# Patient Record
Sex: FEMALE | Race: WHITE | Employment: PART TIME | URBAN - METROPOLITAN AREA
[De-identification: names, ages, dates, MRNs, and addresses within clinical notes are randomized per-mention and may not be internally consistent; named-entity substitution may affect disease eponyms.]

---

## 2019-08-21 ENCOUNTER — OFFICE VISIT (OUTPATIENT)
Dept: OBGYN CLINIC | Age: 34
End: 2019-08-21
Payer: MEDICAID

## 2019-08-21 VITALS
SYSTOLIC BLOOD PRESSURE: 168 MMHG | BODY MASS INDEX: 40.85 KG/M2 | DIASTOLIC BLOOD PRESSURE: 100 MMHG | WEIGHT: 222 LBS | HEART RATE: 111 BPM | HEIGHT: 62 IN

## 2019-08-21 DIAGNOSIS — Z32.02 NEGATIVE PREGNANCY TEST: ICD-10-CM

## 2019-08-21 DIAGNOSIS — N91.2 AMENORRHEA: Primary | ICD-10-CM

## 2019-08-21 LAB
CONTROL: NORMAL
PREGNANCY TEST URINE, POC: NORMAL

## 2019-08-21 PROCEDURE — G8417 CALC BMI ABV UP PARAM F/U: HCPCS | Performed by: SPECIALIST

## 2019-08-21 PROCEDURE — G8427 DOCREV CUR MEDS BY ELIG CLIN: HCPCS | Performed by: SPECIALIST

## 2019-08-21 PROCEDURE — 4004F PT TOBACCO SCREEN RCVD TLK: CPT | Performed by: SPECIALIST

## 2019-08-21 PROCEDURE — 81025 URINE PREGNANCY TEST: CPT | Performed by: SPECIALIST

## 2019-08-21 PROCEDURE — 99213 OFFICE O/P EST LOW 20 MIN: CPT | Performed by: SPECIALIST

## 2019-08-21 ASSESSMENT — ENCOUNTER SYMPTOMS
NAUSEA: 0
ABDOMINAL PAIN: 0
EYE PAIN: 0
APNEA: 0
ABDOMINAL DISTENTION: 0
VOMITING: 0
CONSTIPATION: 0
COUGH: 0
DIARRHEA: 0

## 2020-09-04 ENCOUNTER — APPOINTMENT (OUTPATIENT)
Dept: CT IMAGING | Age: 35
End: 2020-09-04
Payer: MEDICAID

## 2020-09-04 ENCOUNTER — HOSPITAL ENCOUNTER (EMERGENCY)
Age: 35
Discharge: HOME | End: 2020-09-04
Attending: EMERGENCY MEDICINE
Payer: MEDICAID

## 2020-09-04 VITALS
TEMPERATURE: 98.5 F | HEART RATE: 91 BPM | DIASTOLIC BLOOD PRESSURE: 86 MMHG | OXYGEN SATURATION: 99 % | BODY MASS INDEX: 40.24 KG/M2 | SYSTOLIC BLOOD PRESSURE: 141 MMHG | RESPIRATION RATE: 13 BRPM | WEIGHT: 220 LBS

## 2020-09-04 LAB
ABSOLUTE EOS #: 0.12 K/UL (ref 0–0.44)
ABSOLUTE IMMATURE GRANULOCYTE: 0.08 K/UL (ref 0–0.3)
ABSOLUTE LYMPH #: 1.75 K/UL (ref 1.1–3.7)
ABSOLUTE MONO #: 0.43 K/UL (ref 0.1–1.2)
ANION GAP SERPL CALCULATED.3IONS-SCNC: 13 MMOL/L (ref 9–17)
BASOPHILS # BLD: 1 % (ref 0–2)
BASOPHILS ABSOLUTE: 0.06 K/UL (ref 0–0.2)
BUN BLDV-MCNC: 8 MG/DL (ref 6–20)
BUN/CREAT BLD: NORMAL (ref 9–20)
CALCIUM SERPL-MCNC: 9.4 MG/DL (ref 8.6–10.4)
CHLORIDE BLD-SCNC: 105 MMOL/L (ref 98–107)
CO2: 22 MMOL/L (ref 20–31)
CREAT SERPL-MCNC: 0.68 MG/DL (ref 0.5–0.9)
DIFFERENTIAL TYPE: ABNORMAL
EOSINOPHILS RELATIVE PERCENT: 1 % (ref 1–4)
GFR AFRICAN AMERICAN: >60 ML/MIN
GFR NON-AFRICAN AMERICAN: >60 ML/MIN
GFR SERPL CREATININE-BSD FRML MDRD: NORMAL ML/MIN/{1.73_M2}
GFR SERPL CREATININE-BSD FRML MDRD: NORMAL ML/MIN/{1.73_M2}
GLUCOSE BLD-MCNC: 99 MG/DL (ref 70–99)
HCG QUALITATIVE: NEGATIVE
HCT VFR BLD CALC: 42.6 % (ref 36.3–47.1)
HEMOGLOBIN: 14.2 G/DL (ref 11.9–15.1)
IMMATURE GRANULOCYTES: 1 %
LYMPHOCYTES # BLD: 18 % (ref 24–43)
MCH RBC QN AUTO: 31.3 PG (ref 25.2–33.5)
MCHC RBC AUTO-ENTMCNC: 33.3 G/DL (ref 28.4–34.8)
MCV RBC AUTO: 93.8 FL (ref 82.6–102.9)
MONOCYTES # BLD: 4 % (ref 3–12)
NRBC AUTOMATED: 0 PER 100 WBC
PDW BLD-RTO: 12.7 % (ref 11.8–14.4)
PLATELET # BLD: 205 K/UL (ref 138–453)
PLATELET ESTIMATE: ABNORMAL
PMV BLD AUTO: 10.7 FL (ref 8.1–13.5)
POTASSIUM SERPL-SCNC: 4 MMOL/L (ref 3.7–5.3)
RBC # BLD: 4.54 M/UL (ref 3.95–5.11)
RBC # BLD: ABNORMAL 10*6/UL
SEG NEUTROPHILS: 75 % (ref 36–65)
SEGMENTED NEUTROPHILS ABSOLUTE COUNT: 7.47 K/UL (ref 1.5–8.1)
SODIUM BLD-SCNC: 140 MMOL/L (ref 135–144)
WBC # BLD: 9.9 K/UL (ref 3.5–11.3)
WBC # BLD: ABNORMAL 10*3/UL

## 2020-09-04 PROCEDURE — 6360000004 HC RX CONTRAST MEDICATION: Performed by: STUDENT IN AN ORGANIZED HEALTH CARE EDUCATION/TRAINING PROGRAM

## 2020-09-04 PROCEDURE — 85025 COMPLETE CBC W/AUTO DIFF WBC: CPT

## 2020-09-04 PROCEDURE — 84703 CHORIONIC GONADOTROPIN ASSAY: CPT

## 2020-09-04 PROCEDURE — 99284 EMERGENCY DEPT VISIT MOD MDM: CPT

## 2020-09-04 PROCEDURE — 96376 TX/PRO/DX INJ SAME DRUG ADON: CPT

## 2020-09-04 PROCEDURE — 96374 THER/PROPH/DIAG INJ IV PUSH: CPT

## 2020-09-04 PROCEDURE — 72131 CT LUMBAR SPINE W/O DYE: CPT

## 2020-09-04 PROCEDURE — 72125 CT NECK SPINE W/O DYE: CPT

## 2020-09-04 PROCEDURE — 71260 CT THORAX DX C+: CPT

## 2020-09-04 PROCEDURE — 70450 CT HEAD/BRAIN W/O DYE: CPT

## 2020-09-04 PROCEDURE — 80048 BASIC METABOLIC PNL TOTAL CA: CPT

## 2020-09-04 PROCEDURE — 72128 CT CHEST SPINE W/O DYE: CPT

## 2020-09-04 PROCEDURE — 6360000002 HC RX W HCPCS: Performed by: STUDENT IN AN ORGANIZED HEALTH CARE EDUCATION/TRAINING PROGRAM

## 2020-09-04 RX ORDER — ACETAMINOPHEN 500 MG
500 TABLET ORAL 4 TIMES DAILY PRN
Qty: 30 TABLET | Refills: 0 | Status: SHIPPED | OUTPATIENT
Start: 2020-09-04

## 2020-09-04 RX ORDER — FENTANYL CITRATE 50 UG/ML
50 INJECTION, SOLUTION INTRAMUSCULAR; INTRAVENOUS ONCE
Status: COMPLETED | OUTPATIENT
Start: 2020-09-04 | End: 2020-09-04

## 2020-09-04 RX ORDER — IBUPROFEN 600 MG/1
600 TABLET ORAL EVERY 6 HOURS PRN
Qty: 30 TABLET | Refills: 0 | Status: SHIPPED | OUTPATIENT
Start: 2020-09-04

## 2020-09-04 RX ORDER — CYCLOBENZAPRINE HCL 5 MG
5 TABLET ORAL 2 TIMES DAILY PRN
Qty: 10 TABLET | Refills: 0 | Status: SHIPPED | OUTPATIENT
Start: 2020-09-04 | End: 2020-09-14

## 2020-09-04 RX ADMIN — FENTANYL CITRATE 50 MCG: 50 INJECTION, SOLUTION INTRAMUSCULAR; INTRAVENOUS at 15:15

## 2020-09-04 RX ADMIN — IOHEXOL 75 ML: 350 INJECTION, SOLUTION INTRAVENOUS at 16:19

## 2020-09-04 RX ADMIN — FENTANYL CITRATE 50 MCG: 50 INJECTION, SOLUTION INTRAMUSCULAR; INTRAVENOUS at 16:47

## 2020-09-04 ASSESSMENT — PAIN DESCRIPTION - PAIN TYPE: TYPE: ACUTE PAIN

## 2020-09-04 ASSESSMENT — PAIN DESCRIPTION - PROGRESSION: CLINICAL_PROGRESSION: GRADUALLY WORSENING

## 2020-09-04 ASSESSMENT — PAIN SCALES - GENERAL
PAINLEVEL_OUTOF10: 10

## 2020-09-04 ASSESSMENT — PAIN DESCRIPTION - ONSET: ONSET: SUDDEN

## 2020-09-04 ASSESSMENT — PAIN DESCRIPTION - DESCRIPTORS: DESCRIPTORS: ACHING

## 2020-09-04 ASSESSMENT — PAIN DESCRIPTION - ORIENTATION: ORIENTATION: MID

## 2020-09-04 ASSESSMENT — PAIN DESCRIPTION - LOCATION: LOCATION: BACK;HEAD
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
## 2020-09-04 NOTE — ED NOTES
Bed: 21  Expected date:   Expected time:   Means of arrival:   Comments:  [id number] Yovanny Velazquez RN  09/04/20 1443

## 2020-09-04 NOTE — RESULT ENCOUNTER NOTE
Addressed in ED   Patient needs appointment as a new patient with any of the providers  No future appointments.

## 2020-09-08 ENCOUNTER — TELEPHONE (OUTPATIENT)
Dept: FAMILY MEDICINE CLINIC | Age: 35
End: 2020-09-08
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
## 2023-08-18 ENCOUNTER — HOSPITAL ENCOUNTER (EMERGENCY)
Age: 38
Discharge: HOME | End: 2023-08-18
Attending: EMERGENCY MEDICINE
Payer: MEDICAID

## 2023-08-18 ENCOUNTER — APPOINTMENT (OUTPATIENT)
Dept: ULTRASOUND IMAGING | Age: 38
End: 2023-08-18
Payer: MEDICAID

## 2023-08-18 ENCOUNTER — APPOINTMENT (OUTPATIENT)
Dept: CT IMAGING | Age: 38
End: 2023-08-18
Payer: MEDICAID

## 2023-08-18 VITALS
HEIGHT: 63 IN | DIASTOLIC BLOOD PRESSURE: 77 MMHG | RESPIRATION RATE: 20 BRPM | OXYGEN SATURATION: 98 % | HEART RATE: 64 BPM | WEIGHT: 206.57 LBS | BODY MASS INDEX: 36.6 KG/M2 | SYSTOLIC BLOOD PRESSURE: 142 MMHG | TEMPERATURE: 98.3 F

## 2023-08-18 DIAGNOSIS — R22.1 NECK MASS: Primary | ICD-10-CM

## 2023-08-18 LAB
BASOPHILS # BLD: 0.05 K/UL (ref 0–0.2)
BASOPHILS NFR BLD: 1 % (ref 0–2)
EOSINOPHIL # BLD: 0.11 K/UL (ref 0–0.44)
EOSINOPHILS RELATIVE PERCENT: 2 % (ref 1–4)
ERYTHROCYTE [DISTWIDTH] IN BLOOD BY AUTOMATED COUNT: 11.7 % (ref 11.8–14.4)
HCT VFR BLD AUTO: 37.2 % (ref 36.3–47.1)
HGB BLD-MCNC: 12.1 G/DL (ref 11.9–15.1)
IMM GRANULOCYTES # BLD AUTO: 0.03 K/UL (ref 0–0.3)
IMM GRANULOCYTES NFR BLD: 0 %
LYMPHOCYTES NFR BLD: 1.28 K/UL (ref 1.1–3.7)
LYMPHOCYTES RELATIVE PERCENT: 18 % (ref 24–43)
MCH RBC QN AUTO: 30.6 PG (ref 25.2–33.5)
MCHC RBC AUTO-ENTMCNC: 32.5 G/DL (ref 28.4–34.8)
MCV RBC AUTO: 93.9 FL (ref 82.6–102.9)
MONOCYTES NFR BLD: 0.38 K/UL (ref 0.1–1.2)
MONOCYTES NFR BLD: 5 % (ref 3–12)
NEUTROPHILS NFR BLD: 74 % (ref 36–65)
NEUTS SEG NFR BLD: 5.28 K/UL (ref 1.5–8.1)
NRBC BLD-RTO: 0 PER 100 WBC
PLATELET # BLD AUTO: 208 K/UL (ref 138–453)
PMV BLD AUTO: 10.3 FL (ref 8.1–13.5)
RBC # BLD AUTO: 3.96 M/UL (ref 3.95–5.11)
WBC OTHER # BLD: 7.1 K/UL (ref 3.5–11.3)

## 2023-08-18 PROCEDURE — 99285 EMERGENCY DEPT VISIT HI MDM: CPT

## 2023-08-18 PROCEDURE — 6360000004 HC RX CONTRAST MEDICATION

## 2023-08-18 PROCEDURE — 85025 COMPLETE CBC W/AUTO DIFF WBC: CPT

## 2023-08-18 PROCEDURE — 70491 CT SOFT TISSUE NECK W/DYE: CPT

## 2023-08-18 PROCEDURE — 6370000000 HC RX 637 (ALT 250 FOR IP)

## 2023-08-18 PROCEDURE — 76536 US EXAM OF HEAD AND NECK: CPT

## 2023-08-18 RX ORDER — AMOXICILLIN AND CLAVULANATE POTASSIUM 875; 125 MG/1; MG/1
1 TABLET, FILM COATED ORAL 2 TIMES DAILY
Qty: 14 TABLET | Refills: 0 | Status: SHIPPED | OUTPATIENT
Start: 2023-08-18 | End: 2023-08-25

## 2023-08-18 RX ORDER — DOXYCYCLINE HYCLATE 100 MG
100 TABLET ORAL 2 TIMES DAILY
Status: DISCONTINUED | OUTPATIENT
Start: 2023-08-18 | End: 2023-08-19 | Stop reason: HOSPADM

## 2023-08-18 RX ADMIN — IOPAMIDOL 75 ML: 755 INJECTION, SOLUTION INTRAVENOUS at 20:31

## 2023-08-18 RX ADMIN — DOXYCYCLINE HYCLATE 100 MG: 100 TABLET, COATED ORAL at 19:50

## 2023-08-18 ASSESSMENT — PAIN DESCRIPTION - ORIENTATION: ORIENTATION: LEFT;UPPER

## 2023-08-18 ASSESSMENT — PAIN DESCRIPTION - PAIN TYPE: TYPE: ACUTE PAIN

## 2023-08-18 ASSESSMENT — PAIN SCALES - GENERAL: PAINLEVEL_OUTOF10: 5

## 2023-08-18 ASSESSMENT — PAIN - FUNCTIONAL ASSESSMENT: PAIN_FUNCTIONAL_ASSESSMENT: 0-10

## 2023-08-18 ASSESSMENT — PAIN DESCRIPTION - DESCRIPTORS: DESCRIPTORS: STABBING;SHOOTING

## 2023-08-19 NOTE — DISCHARGE INSTRUCTIONS
Please follow-up with Ear, Nose, and Throat (ENT) specialist for further care and evaluation of your neck mass. You have been prescribed an antibiotic for possible infection. Please take the entire course of antibiotics and do not miss any doses.

## 2023-08-28 ENCOUNTER — TELEPHONE (OUTPATIENT)
Age: 38
End: 2023-08-28

## 2023-08-28 NOTE — TELEPHONE ENCOUNTER
Patient is scheduled with Dr. Amy Taveras on 08/31/23. Dr. Amy Taveras reviewed patient's chart and recommends patient to see ENT- Dr. Richardson Chau for her neck mass. Case discussed with Dr. Richardson Chau and his office will call patient to schedule. Please cancel general surgery appt.

## 2023-10-07 ENCOUNTER — HOSPITAL ENCOUNTER (OUTPATIENT)
Dept: MRI IMAGING | Age: 38
End: 2023-10-07
Payer: MEDICAID

## 2023-10-07 DIAGNOSIS — R22.1 MASS OF PARAPHARYNGEAL SPACE: ICD-10-CM

## 2023-10-07 LAB
CREAT SERPL-MCNC: 0.9 MG/DL (ref 0.5–0.9)
GFR SERPL CREATININE-BSD FRML MDRD: >60 ML/MIN/1.73M2

## 2023-10-07 PROCEDURE — A9579 GAD-BASE MR CONTRAST NOS,1ML: HCPCS | Performed by: STUDENT IN AN ORGANIZED HEALTH CARE EDUCATION/TRAINING PROGRAM

## 2023-10-07 PROCEDURE — 82565 ASSAY OF CREATININE: CPT

## 2023-10-07 PROCEDURE — 36415 COLL VENOUS BLD VENIPUNCTURE: CPT

## 2023-10-07 PROCEDURE — 6360000004 HC RX CONTRAST MEDICATION: Performed by: STUDENT IN AN ORGANIZED HEALTH CARE EDUCATION/TRAINING PROGRAM

## 2023-10-07 PROCEDURE — 70543 MRI ORBT/FAC/NCK W/O &W/DYE: CPT

## 2023-10-07 RX ADMIN — GADOTERIDOL 19 ML: 279.3 INJECTION, SOLUTION INTRAVENOUS at 11:48

## 2023-10-16 ENCOUNTER — TELEPHONE (OUTPATIENT)
Dept: OTOLARYNGOLOGY | Age: 38
End: 2023-10-16

## 2023-10-16 DIAGNOSIS — R22.1 MASS OF PARAPHARYNGEAL SPACE: Primary | ICD-10-CM

## 2023-10-16 NOTE — TELEPHONE ENCOUNTER
Called and discussed MRI results with patient and discussed need for biopsy from IR ASAP. Patient verbalized understanding and is in agreement with this plan.

## 2023-10-27 ENCOUNTER — HOSPITAL ENCOUNTER (OUTPATIENT)
Dept: ULTRASOUND IMAGING | Age: 38
End: 2023-10-27
Payer: MEDICAID

## 2023-10-27 VITALS — SYSTOLIC BLOOD PRESSURE: 122 MMHG | OXYGEN SATURATION: 100 % | DIASTOLIC BLOOD PRESSURE: 84 MMHG

## 2023-10-27 DIAGNOSIS — R22.1 MASS OF PARAPHARYNGEAL SPACE: ICD-10-CM

## 2023-10-27 PROCEDURE — 87176 TISSUE HOMOGENIZATION CULTR: CPT

## 2023-10-27 PROCEDURE — 88333 PATH CONSLTJ SURG CYTO XM 1: CPT

## 2023-10-27 PROCEDURE — 87075 CULTR BACTERIA EXCEPT BLOOD: CPT

## 2023-10-27 PROCEDURE — 87070 CULTURE OTHR SPECIMN AEROBIC: CPT

## 2023-10-27 PROCEDURE — 88172 CYTP DX EVAL FNA 1ST EA SITE: CPT

## 2023-10-27 PROCEDURE — 88342 IMHCHEM/IMCYTCHM 1ST ANTB: CPT

## 2023-10-27 PROCEDURE — 38505 NEEDLE BIOPSY LYMPH NODES: CPT

## 2023-10-27 PROCEDURE — 88305 TISSUE EXAM BY PATHOLOGIST: CPT

## 2023-10-27 PROCEDURE — 76942 ECHO GUIDE FOR BIOPSY: CPT

## 2023-10-27 PROCEDURE — 87205 SMEAR GRAM STAIN: CPT

## 2023-10-27 PROCEDURE — 88173 CYTOPATH EVAL FNA REPORT: CPT

## 2023-10-27 NOTE — PROGRESS NOTES
Here for a cervical LN biopsy. Consent done. Dr Araujo/ Joanne Mendenhall present. Left neck,prepped, draped and numbed with lidocaine. Cytology here. 4 FNA's and 1 core specimens obtained. Post scan done. Bandaid on and patient discharged with ice pack Tolerated well.

## 2023-10-27 NOTE — BRIEF OP NOTE
Brief Postoperative Note    María Elena Felix  YOB: 1985  [id number]    Pre-operative Diagnosis: Left submandibular lymphadenopathy    Post-operative Diagnosis: Same    Procedure: LN bx    Anesthesia: Local    Surgeons/Assistants: Maria Fernanda Roberson MD    Estimated Blood Loss: less than 50     Complications: None    Specimens: Was Obtained:     Findings: Successful left submandibular LN bx.  4 FNA samples and 1 core sample obtained.      Electronically signed by YUDY Ricks on 10/27/2023 at 9:13 AM

## 2023-10-29 LAB
MICROORGANISM SPEC CULT: NORMAL
MICROORGANISM/AGENT SPEC: NORMAL
MICROORGANISM/AGENT SPEC: NORMAL
SPECIMEN DESCRIPTION: NORMAL

## 2023-10-30 LAB — SURGICAL PATHOLOGY REPORT: NORMAL

## 2023-10-31 ENCOUNTER — TELEPHONE (OUTPATIENT)
Dept: OTOLARYNGOLOGY | Age: 38
End: 2023-10-31

## 2023-10-31 DIAGNOSIS — R22.1 MASS OF PARAPHARYNGEAL SPACE: Primary | ICD-10-CM

## 2023-10-31 NOTE — TELEPHONE ENCOUNTER
BRIEF ENT TELEPHONE NOTE    Attempted to reach patient to discuss FNA biopsy results:    -------------------------------------------------------------------------------------------    Path Number: [id number]     INTERPRETATION     LEFT SUBMANDIBULAR LYMPH NODE, FINE NEEDLE ASPIRATION:           SUSPICIOUS FOR CARCINOMA. SEE COMMENT. **Electronically Signed Out**         Alannah Augustin. Srinivas Quinonez M.D.   kmg2/10/30/2023   Comment   Smears show cohesive groups of atypical cells with increased nuclear :   cytoplasmic ratio and nuclear irregularities. However, the cell block   does not contain atypical cells, which precludes further diagnostic   workup. The findings are suspicious for carcinoma. If further   classification is required, additional tissue biopsy is recommended. Slides are reviewed by a second pathologist who concurs with the   diagnosis (DS).  -------------------------------------------------------------------------------------------     I will attempt to reach patient at later time. We have referred her to [facility] and Ne[facility] who will be contacting patient to set up appointment.     Juanita Douglass MD  Otolaryngology - Head and [address] @ [facility]    Can be reach via [facility]

## 2023-10-31 NOTE — TELEPHONE ENCOUNTER
BRIEF ENT TELEPHONE NOTE    -Discussed results of biopsy with patient. We will get her care transitioned to [facility] and Neck Surgery. She will call our clinic to let us know if she hasn't heard from their office by the end of the week.      Fredo Chan MD  Otolaryngology - Head and [address] @ [facility]

## 2023-11-01 LAB
MICROORGANISM SPEC CULT: NORMAL
MICROORGANISM/AGENT SPEC: NORMAL
MICROORGANISM/AGENT SPEC: NORMAL
SPECIMEN DESCRIPTION: NORMAL
SURGICAL PATHOLOGY REPORT: NORMAL

## 2023-11-14 ENCOUNTER — TELEPHONE (OUTPATIENT)
Dept: OTOLARYNGOLOGY | Age: 38
End: 2023-11-14

## 2023-11-14 DIAGNOSIS — R22.1 MASS OF PARAPHARYNGEAL SPACE: Primary | ICD-10-CM

## 2023-11-14 NOTE — TELEPHONE ENCOUNTER
BRIEF ENT TELEPHONE NOTE    Telephone check in. Patient was seen by [facility] Neck Surgery yesterday. Per patient, she is scheduled for surgery next Wednesday. She will call our office with any additional questions or concerns.     Fredo Chan MD  Otolaryngology - Head and [address] @ [facility]

## 2024-02-28 ENCOUNTER — HOSPITAL ENCOUNTER (EMERGENCY)
Age: 39
Discharge: HOME | End: 2024-02-28
Attending: EMERGENCY MEDICINE
Payer: MEDICAID

## 2024-02-28 ENCOUNTER — APPOINTMENT (OUTPATIENT)
Dept: GENERAL RADIOLOGY | Age: 39
End: 2024-02-28
Payer: MEDICAID

## 2024-02-28 ENCOUNTER — APPOINTMENT (OUTPATIENT)
Dept: VASCULAR LAB | Age: 39
End: 2024-02-28
Payer: MEDICAID

## 2024-02-28 VITALS
OXYGEN SATURATION: 100 % | DIASTOLIC BLOOD PRESSURE: 91 MMHG | SYSTOLIC BLOOD PRESSURE: 127 MMHG | BODY MASS INDEX: 33.53 KG/M2 | TEMPERATURE: 98.6 F | HEART RATE: 89 BPM | RESPIRATION RATE: 16 BRPM | WEIGHT: 186.29 LBS

## 2024-02-28 DIAGNOSIS — M79.601 PAIN AND SWELLING OF RIGHT UPPER EXTREMITY: Primary | ICD-10-CM

## 2024-02-28 DIAGNOSIS — M79.89 PAIN AND SWELLING OF RIGHT UPPER EXTREMITY: Primary | ICD-10-CM

## 2024-02-28 PROCEDURE — 71045 X-RAY EXAM CHEST 1 VIEW: CPT

## 2024-02-28 PROCEDURE — 99284 EMERGENCY DEPT VISIT MOD MDM: CPT

## 2024-02-28 PROCEDURE — 93971 EXTREMITY STUDY: CPT | Performed by: SURGERY

## 2024-02-28 PROCEDURE — 6370000000 HC RX 637 (ALT 250 FOR IP): Performed by: STUDENT IN AN ORGANIZED HEALTH CARE EDUCATION/TRAINING PROGRAM

## 2024-02-28 PROCEDURE — 93971 EXTREMITY STUDY: CPT

## 2024-02-28 RX ORDER — ONDANSETRON 4 MG/1
4 TABLET, ORALLY DISINTEGRATING ORAL ONCE
Status: COMPLETED | OUTPATIENT
Start: 2024-02-28 | End: 2024-02-28

## 2024-02-28 RX ADMIN — ONDANSETRON 4 MG: 4 TABLET, ORALLY DISINTEGRATING ORAL at 09:37

## 2024-02-28 ASSESSMENT — ENCOUNTER SYMPTOMS
SORE THROAT: 0
WHEEZING: 0
ABDOMINAL DISTENTION: 0
COLOR CHANGE: 0
SHORTNESS OF BREATH: 0
VOMITING: 0
NAUSEA: 0
SINUS PRESSURE: 0
SINUS PAIN: 0
PHOTOPHOBIA: 0
COUGH: 0
BACK PAIN: 0
DIARRHEA: 0
ABDOMINAL PAIN: 0

## 2024-02-28 ASSESSMENT — PAIN SCALES - GENERAL: PAINLEVEL_OUTOF10: 6

## 2024-02-28 ASSESSMENT — PAIN DESCRIPTION - ORIENTATION: ORIENTATION: RIGHT

## 2024-02-28 ASSESSMENT — PAIN DESCRIPTION - LOCATION: LOCATION: ARM

## 2024-02-28 NOTE — ED NOTES
Patient presents to ED ambulatory to room. Patient has complaints of right arm pain and swelling after a hydration infusion yesterday. Patient has a port on her right side of chest. Patient noticed the pain after the infusion and the swelling of arm and hand this morning. Patient has head and neck cancer and is currently receiving treatment. Patient last chemo treatment was on Friday. Patient is rating the pain at a 6/10 and denies taking anything for the pain.

## 2024-02-28 NOTE — DISCHARGE INSTRUCTIONS
Seen in the emergency department for right arm swelling and pain.  Ultrasound performed.  No deep vein thrombosis or blood clot.  Appears you do have a kinked port which was seen on chest x-ray.  Discussed this case with vascular surgery who is recommending that you follow-up in their outpatient office tomorrow.  Please call their office first thing tomorrow to schedule an appointment.  Please return to the emergency department if you develop worsening pain, swelling, fevers.  Please continue to wear Ace wrap for compression which helps with the swelling.  Continue to follow with your primary care as needed
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
## 2024-02-28 NOTE — ED PROVIDER NOTES
compartment syndrome.  No acute skin changes, rash, erythema, open lesions, deformities, trauma, active bleeding, or any signs of infection.  Of note, she does have old bruising at her breast tissue and immediately surrounding her port site.  Per patient she had a very traumatic port placement and has been dealing with bruising since then.  Received IV hydration yesterday and since then has been dealing with a swollen right upper extremity with pain.    She arrives in no apparent distress with a heart rate of 89 and blood pressure of 127/91.  She satting 100% on room air without increased work of breathing breathing 16 times per minute.  Afebrile.  She denies chest pain, shortness of breath, abdominal pain.  No changes from her baseline nausea and vomiting with chemotherapy.  Concerns for DVT or potential port complication.  Plan for venous duplex to rule out DVT along with chest x-ray in order to evaluate port placement.     Amount and/or Complexity of Data Reviewed  Radiology: ordered. Decision-making details documented in ED Course.    Risk  Prescription drug management.      EKG  N/a    All EKG's are interpreted by the Emergency Department Physician who either signs or Co-signs this chart in the absence of a cardiologist.    EMERGENCY DEPARTMENT COURSE:    ED Course as of 02/28/24 1507   Wed Feb 28, 2024   1229 Vascular duplex upper extremity venous right  No evidence of deep vein or superficial vein thrombosis. The internal jugular, subclavian, axillary, brachial, radial, ulnar, basilic, and cephalic veins were imaged in the transverse view and showed normal compressibility. The internal jugular, subclavian, and axillary veins were imaged in the longitudinal view and showed normal color filling and normal phasic and spontaneous flow. [GC]   1230 XR CHEST PORTABLE  IMPRESSION:  1. Right-sided port with the tip over the proximal SVC and mild kinking of  the proximal aspect of the catheter.  2. Bibasilar  atelectasis.  The lungs are otherwise clear. [GC]   1238 Vascular surgery consulted [GC]   1504 Discussed the case with vascular surgery attending, Dr. Couch, who recommended that the patient follow-up in their outpatient office tomorrow.  Recommended Ace wrap compression. [GC]   1505 Patient is medically stable to be discharged home.  Has appropriate follow-up.  Given appropriate return instructions.  Remains neurovascular intact at time of discharge.  Continue to have no concerns for compartment syndrome. [GC]   1507 Educated on compartment syndrome and attached information regarding compartment syndrome [GC]      ED Course User Index  [GC] Jeremy Bolton DO       PROCEDURES:  N/a    CONSULTS:  IP CONSULT TO VASCULAR SURGERY    CRITICAL CARE:  There was significant risk of life threatening deterioration of patient's condition requiring my direct management. Critical care time 00 minutes, excluding any documented procedures.    FINAL IMPRESSION      1. Pain and swelling of right upper extremity          DISPOSITION / PLAN     DISPOSITION Decision To Discharge 02/28/2024 03:01:11 PM      PATIENT REFERRED TO:  Mindi Olivas, APRN  [address] [facility] [address]  [phone]    Call in 1 day  for ED follow-up    Monse Graves MD  [address] [address] [id number]  [facility] [id number] [phone]    Call in 1 day  for right arm swelling      DISCHARGE MEDICATIONS:  New Prescriptions    No medications on file       Jeremy Bolton DO  Emergency Medicine Resident    (Please note that portions of this note were completed with a voice recognition program.  Efforts were made to edit the dictations but occasionally words are mis-transcribed.)

## 2024-02-28 NOTE — ED PROVIDER NOTES
[facility]     Emergency Department     Faculty Attestation  9:23 AM EST      I performed a history and physical examination of the patient and discussed management with the resident. I have reviewed and agree with the resident’s findings including all diagnostic interpretations, and treatment plans as written. Any areas of disagreement are noted on the chart. I was personally present for the key portions of any procedures. I have documented in the chart those procedures where I was not present during the key portions. I have reviewed the emergency nurses triage note. I agree with the chief complaint, past medical history, past surgical history, allergies, medications, social and family history as documented unless otherwise noted below. Documentation of the HPI, Physical Exam and Medical Decision Making performed by scribcara is based on my personal performance of the HPI, PE and MDM. For Physician Assistant/ Nurse Practitioner cases/documentation I have personally evaluated this patient and have completed at least one if not all key elements of the E/M (history, physical exam, and MDM). Additional findings are as noted.    Primary Care Physician: Mindi Olivas APRN    History: This is a 38 y.o. female who presents to the Emergency WITH pain and swelling to her right arm.  Patient has a port to her right chest.  Which was placed earlier this month as she has a history of squamous cell carcinoma and is undergoing radiation as well as chemotherapy.  Patient had hydration yesterday and started having discomfort after that.  And came in for further evaluation.  On exam    Patient does have some mottling and some swelling noted to the entire right upper extremity.  She does have a 2+ radial pulse that is equal.  She does have tenderness diffusely of that arm.  And it does seem more swollen than her left arm when she does not have any swelling or discomfort.   She does have some ecchymosis that appears old to her right breast.  And the incision over her port is approximated without any surrounding erythema or edema.    Concern for possible thrombus.  Will plan on vascular studies.  Check labs.  Xray to confirm port placement    Janelle Sesay D.O, M.P.H  Attending Emergency Medicine Physician         Janelle Sesay,   02/28/24 [id number]

## 2024-02-29 ENCOUNTER — INITIAL CONSULT (OUTPATIENT)
Dept: VASCULAR SURGERY | Age: 39
End: 2024-02-29

## 2024-02-29 VITALS
HEIGHT: 63 IN | RESPIRATION RATE: 18 BRPM | WEIGHT: 183 LBS | DIASTOLIC BLOOD PRESSURE: 90 MMHG | HEART RATE: 98 BPM | BODY MASS INDEX: 32.43 KG/M2 | SYSTOLIC BLOOD PRESSURE: 141 MMHG | OXYGEN SATURATION: 100 %

## 2024-02-29 DIAGNOSIS — Z98.890 HISTORY OF INSERTION OF TUNNELED CENTRAL VENOUS CATHETER (CVC) WITH PORT: ICD-10-CM

## 2024-02-29 DIAGNOSIS — M79.601 PAIN AND SWELLING OF UPPER EXTREMITY, RIGHT: Primary | ICD-10-CM

## 2024-02-29 DIAGNOSIS — M79.89 PAIN AND SWELLING OF UPPER EXTREMITY, RIGHT: Primary | ICD-10-CM

## 2024-02-29 NOTE — PROGRESS NOTES
Radial pulses are 2+ on the right side and 2+ on the left side.   Pulmonary:      Effort: Pulmonary effort is normal. No respiratory distress.   Chest:      Comments: Port palpated over right chest  Abdominal:      Palpations: Abdomen is soft.      Tenderness: There is no abdominal tenderness.   Musculoskeletal:      Right upper arm: Swelling present. No tenderness.      Left upper arm: No swelling or tenderness.      Right forearm: Swelling present. No tenderness.      Left forearm: No swelling or tenderness.      Right hand: Swelling present. Normal strength. Normal sensation. Normal capillary refill.      Left hand: No swelling. Normal strength. Normal sensation. Normal capillary refill.      Cervical back: Full passive range of motion without pain.      Right lower leg: No edema.      Left lower leg: No edema.   Skin:     General: Skin is warm.      Capillary Refill: Capillary refill takes less than 2 seconds.      Comments: Venous congestion in right upper extremity with bluish hue   Neurological:      Mental Status: She is alert and oriented to person, place, and time.      GCS: GCS eye subscore is 4. GCS verbal subscore is 5. GCS motor subscore is 6.      Sensory: Sensation is intact.      Motor: Motor function is intact.   Psychiatric:         Mood and Affect: Mood normal.         Speech: Speech normal.         Behavior: Behavior normal.         Thought Content: Thought content normal.                Imaging/Labs:         Assessment and Plan:     Right arm swelling, presence of central venous catheter with subcutaneous port  Suspect she has develop element of venous thoracic outlet syndrome with congestion  I explained to her that removing port should help with the swelling issues  For now will try symptomatic relief with compression and elevation, ace wrap applied in the office and showed her how to do it for effective compression until her need for port is completed   Will plan to remove port once

## 2024-03-07 ASSESSMENT — ENCOUNTER SYMPTOMS
ALLERGIC/IMMUNOLOGIC NEGATIVE: 1
CHEST TIGHTNESS: 0
COLOR CHANGE: 1
SHORTNESS OF BREATH: 0
ABDOMINAL PAIN: 0

## 2024-03-18 ENCOUNTER — TELEPHONE (OUTPATIENT)
Age: 39
End: 2024-03-18

## 2024-03-19 ENCOUNTER — HOSPITAL ENCOUNTER (OUTPATIENT)
Age: 39
Discharge: HOME | End: 2024-03-21
Attending: SURGERY
Payer: MEDICAID

## 2024-03-19 VITALS
RESPIRATION RATE: 16 BRPM | HEART RATE: 88 BPM | OXYGEN SATURATION: 100 % | BODY MASS INDEX: 33.67 KG/M2 | WEIGHT: 182.98 LBS | HEIGHT: 62 IN | DIASTOLIC BLOOD PRESSURE: 69 MMHG | SYSTOLIC BLOOD PRESSURE: 126 MMHG | TEMPERATURE: 96.3 F

## 2024-03-19 DIAGNOSIS — C44.92 SCC (SQUAMOUS CELL CARCINOMA): ICD-10-CM

## 2024-03-19 PROCEDURE — 2580000003 HC RX 258: Performed by: SURGERY

## 2024-03-19 PROCEDURE — 7100000011 HC PHASE II RECOVERY - ADDTL 15 MIN

## 2024-03-19 PROCEDURE — 2500000003 HC RX 250 WO HCPCS: Performed by: SURGERY

## 2024-03-19 PROCEDURE — 6360000002 HC RX W HCPCS: Performed by: SURGERY

## 2024-03-19 PROCEDURE — 36590 REMOVAL TUNNELED CV CATH: CPT

## 2024-03-19 PROCEDURE — 7100000010 HC PHASE II RECOVERY - FIRST 15 MIN

## 2024-03-19 RX ORDER — CEFAZOLIN SODIUM 1 G/3ML
INJECTION, POWDER, FOR SOLUTION INTRAMUSCULAR; INTRAVENOUS PRN
Status: COMPLETED | OUTPATIENT
Start: 2024-03-19 | End: 2024-03-19

## 2024-03-19 RX ORDER — FENTANYL CITRATE 50 UG/ML
INJECTION, SOLUTION INTRAMUSCULAR; INTRAVENOUS PRN
Status: COMPLETED | OUTPATIENT
Start: 2024-03-19 | End: 2024-03-19

## 2024-03-19 RX ORDER — MIDAZOLAM HYDROCHLORIDE 5 MG/ML
INJECTION INTRAMUSCULAR; INTRAVENOUS PRN
Status: COMPLETED | OUTPATIENT
Start: 2024-03-19 | End: 2024-03-19

## 2024-03-19 RX ORDER — SODIUM CHLORIDE 9 MG/ML
INJECTION, SOLUTION INTRAVENOUS CONTINUOUS
Status: DISCONTINUED | OUTPATIENT
Start: 2024-03-19 | End: 2024-03-22 | Stop reason: HOSPADM

## 2024-03-19 RX ORDER — LIDOCAINE HYDROCHLORIDE 10 MG/ML
INJECTION, SOLUTION INFILTRATION; PERINEURAL PRN
Status: COMPLETED | OUTPATIENT
Start: 2024-03-19 | End: 2024-03-19

## 2024-03-19 RX ADMIN — SODIUM CHLORIDE: 9 INJECTION, SOLUTION INTRAVENOUS at 11:34

## 2024-03-19 RX ADMIN — CEFAZOLIN 2000 MG: 1 INJECTION, POWDER, FOR SOLUTION INTRAMUSCULAR; INTRAVENOUS at 12:50

## 2024-03-19 RX ADMIN — FENTANYL CITRATE 50 MCG: 50 INJECTION, SOLUTION INTRAMUSCULAR; INTRAVENOUS at 12:50

## 2024-03-19 RX ADMIN — MIDAZOLAM HYDROCHLORIDE 2 MG: 5 INJECTION INTRAMUSCULAR; INTRAVENOUS at 12:51

## 2024-03-19 RX ADMIN — LIDOCAINE HYDROCHLORIDE 20 ML: 10 INJECTION, SOLUTION INFILTRATION; PERINEURAL at 12:54

## 2024-03-19 RX ADMIN — FENTANYL CITRATE 25 MCG: 50 INJECTION, SOLUTION INTRAMUSCULAR; INTRAVENOUS at 13:16

## 2024-03-19 ASSESSMENT — PAIN DESCRIPTION - PAIN TYPE
TYPE: ACUTE PAIN
TYPE: ACUTE PAIN

## 2024-03-19 ASSESSMENT — PAIN DESCRIPTION - LOCATION
LOCATION: CHEST
LOCATION: CHEST

## 2024-03-19 ASSESSMENT — PAIN DESCRIPTION - DESCRIPTORS
DESCRIPTORS: STABBING
DESCRIPTORS: STABBING

## 2024-03-19 ASSESSMENT — PAIN SCALES - GENERAL
PAINLEVEL_OUTOF10: 9
PAINLEVEL_OUTOF10: 9

## 2024-03-19 NOTE — PROGRESS NOTES
Patient received post port removal to pacu. Assessment obtained. Chest site with bandaid intact. No hematoma noted.

## 2024-03-19 NOTE — DISCHARGE INSTRUCTIONS
Remove band aid in 1-2 days    Ok to shower in 1-2 days, pat dry over steristrips    Remove steristrips in 5-6 days    Tylenol or motrin as needed for pain/discomfort    Call Dr. Graves if there are any problems or concerns [phone]     Dr. Graves's office will call to arrange follow up appointment in the next 6 weeks regarding arm swelling    Continue to use compression warps to help with swelling

## 2024-03-19 NOTE — PROGRESS NOTES
Patient admitted, consent signed and questions answered. Patient ready for procedure. Call light to reach with side rails u.  S.O. Darek at bedside with patient.  History and physical to be updated.

## 2024-03-19 NOTE — OP NOTE
Operative Note      Patient: María Elena Schaefer  YOB: 1985  MRN: [id number]    Date of Procedure: 3/19/2024    Preoperative Diagnosis:  Presence of central venous catheter with subcutaneous port     Post-Op Diagnosis: Same       Procedure:  Removal of central venous catheter with subcutaneous port under fluoroscopic guidance     Surgeon: Dr. Graves    Anesthesia: 2 mg versed, 75 mcg fentanyl, local    Estimated Blood Loss (mL): 11 ml    Complications: None    Specimens: port and catheter     Implants: none      Drains: None    Findings: Port and catheter removed completely intact, confirmed under fluoroscopy.      Detailed Description of Procedure:     María Elena Schaefer was brought to the [place] catheterization suite for removal of central venous catheter with subcutaneous port.  After appropriate anesthesia delivered, the right chest and neck was prepped and draped in standard sterile fashion, timeout performed and agreed upon, antibiotics given during this period.  I began by giving local anesthesia over the port site.  Small transverse incision created over the previous site.  Cautery used to get through the subcutaneous tissue and the port was identified.  It was externalized and the catheter portion removed easily, it was visually inspected and it was completely intact.  Manual pressure held by the neck to help with hemostasis.  Fluoroscopy performed and confirmed removal of entirety of the catheter.  Wound bed was hemostatic and was closed in multiple interrupted layers of vicryl followed by running monocryl.  Steristrip and band aid applied.  Patient tolerated procedure well and was brought to the recovery room.       Electronically signed by Monse Graves MD on 3/19/2024 at 1:21 PM

## 2024-03-19 NOTE — PROGRESS NOTES
All discharge instructions reviewed, questions answered, paper signed and given copy. Patient discharged per wheelchair with S.O.  and belongings.

## 2024-03-19 NOTE — H&P
Physical Exam:      Vitals:  BP (!) 141/90 (Site: Left Upper Arm, Position: Sitting, Cuff Size: Medium Adult)   Pulse 98   Resp 18   Ht 1.588 m (5' 2.5\")   Wt 83 kg (183 lb)   SpO2 100%   BMI 32.94 kg/m²      Physical Exam  Constitutional:       Appearance: She is well-developed and well-groomed. She is obese.   Eyes:      Extraocular Movements: Extraocular movements intact.      Conjunctiva/sclera: Conjunctivae normal.   Cardiovascular:      Rate and Rhythm: Normal rate and regular rhythm.      Pulses:           Radial pulses are 2+ on the right side and 2+ on the left side.   Pulmonary:      Effort: Pulmonary effort is normal. No respiratory distress.   Chest:      Comments: Port palpated over right chest  Abdominal:      Palpations: Abdomen is soft.      Tenderness: There is no abdominal tenderness.   Musculoskeletal:      Right upper arm: Swelling present. No tenderness.      Left upper arm: No swelling or tenderness.      Right forearm: Swelling present. No tenderness.      Left forearm: No swelling or tenderness.      Right hand: Swelling present. Normal strength. Normal sensation. Normal capillary refill.      Left hand: No swelling. Normal strength. Normal sensation. Normal capillary refill.      Cervical back: Full passive range of motion without pain.      Right lower leg: No edema.      Left lower leg: No edema.   Skin:     General: Skin is warm.      Capillary Refill: Capillary refill takes less than 2 seconds.      Comments: Venous congestion in right upper extremity with bluish hue   Neurological:      Mental Status: She is alert and oriented to person, place, and time.      GCS: GCS eye subscore is 4. GCS verbal subscore is 5. GCS motor subscore is 6.      Sensory: Sensation is intact.      Motor: Motor function is intact.   Psychiatric:         Mood and Affect: Mood normal.         Speech: Speech normal.         Behavior: Behavior normal.         Thought Content: Thought content normal.

## 2024-08-27 ENCOUNTER — TELEPHONE (OUTPATIENT)
Dept: ORTHOPEDIC SURGERY | Age: 39
End: 2024-08-27

## 2024-08-27 NOTE — TELEPHONE ENCOUNTER
Attempted to call patient regarding a referral that was sent to our office. The patient's phone is no longer in service.